# Patient Record
Sex: MALE | Race: BLACK OR AFRICAN AMERICAN | NOT HISPANIC OR LATINO | Employment: STUDENT | URBAN - METROPOLITAN AREA
[De-identification: names, ages, dates, MRNs, and addresses within clinical notes are randomized per-mention and may not be internally consistent; named-entity substitution may affect disease eponyms.]

---
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
## 2023-03-28 ENCOUNTER — HOSPITAL ENCOUNTER (EMERGENCY)
Facility: MEDICAL CENTER | Age: 18
End: 2023-03-28
Attending: EMERGENCY MEDICINE
Payer: COMMERCIAL

## 2023-03-28 VITALS
WEIGHT: 170 LBS | SYSTOLIC BLOOD PRESSURE: 145 MMHG | HEART RATE: 93 BPM | RESPIRATION RATE: 16 BRPM | DIASTOLIC BLOOD PRESSURE: 124 MMHG | BODY MASS INDEX: 23.03 KG/M2 | OXYGEN SATURATION: 97 % | TEMPERATURE: 99.9 F | HEIGHT: 72 IN

## 2023-03-28 DIAGNOSIS — B34.9 VIRAL SYNDROME: ICD-10-CM

## 2023-03-28 PROCEDURE — A9270 NON-COVERED ITEM OR SERVICE: HCPCS | Performed by: EMERGENCY MEDICINE

## 2023-03-28 PROCEDURE — 700111 HCHG RX REV CODE 636 W/ 250 OVERRIDE (IP): Performed by: EMERGENCY MEDICINE

## 2023-03-28 PROCEDURE — 700102 HCHG RX REV CODE 250 W/ 637 OVERRIDE(OP): Performed by: EMERGENCY MEDICINE

## 2023-03-28 PROCEDURE — 99283 EMERGENCY DEPT VISIT LOW MDM: CPT

## 2023-03-28 RX ORDER — ONDANSETRON 4 MG/1
4 TABLET, ORALLY DISINTEGRATING ORAL EVERY 6 HOURS PRN
Qty: 20 TABLET | Refills: 0 | Status: SHIPPED | OUTPATIENT
Start: 2023-03-28

## 2023-03-28 RX ORDER — ONDANSETRON 4 MG/1
4 TABLET, ORALLY DISINTEGRATING ORAL ONCE
Status: COMPLETED | OUTPATIENT
Start: 2023-03-28 | End: 2023-03-28

## 2023-03-28 RX ORDER — POLYETHYLENE GLYCOL 3350 17 G/17G
17 POWDER, FOR SOLUTION ORAL DAILY
Qty: 30 EACH | Refills: 0 | Status: SHIPPED | OUTPATIENT
Start: 2023-03-28

## 2023-03-28 RX ORDER — DEXAMETHASONE SODIUM PHOSPHATE 10 MG/ML
16 INJECTION, SOLUTION INTRAMUSCULAR; INTRAVENOUS ONCE
Status: COMPLETED | OUTPATIENT
Start: 2023-03-28 | End: 2023-03-28

## 2023-03-28 RX ORDER — NAPROXEN 500 MG/1
500 TABLET ORAL ONCE
Status: COMPLETED | OUTPATIENT
Start: 2023-03-28 | End: 2023-03-28

## 2023-03-28 RX ORDER — NAPROXEN 500 MG/1
500 TABLET ORAL 2 TIMES DAILY WITH MEALS
Qty: 60 TABLET | Refills: 0 | Status: SHIPPED | OUTPATIENT
Start: 2023-03-28

## 2023-03-28 RX ADMIN — NAPROXEN 500 MG: 500 TABLET ORAL at 13:54

## 2023-03-28 RX ADMIN — DEXAMETHASONE SODIUM PHOSPHATE 16 MG: 10 INJECTION INTRAMUSCULAR; INTRAVENOUS at 13:54

## 2023-03-28 RX ADMIN — ONDANSETRON 4 MG: 4 TABLET, ORALLY DISINTEGRATING ORAL at 13:54

## 2023-03-28 ASSESSMENT — PAIN DESCRIPTION - PAIN TYPE
TYPE: ACUTE PAIN
TYPE: ACUTE PAIN

## 2023-03-28 NOTE — ED PROVIDER NOTES
"ED Provider Note    Primary care provider: Curt Pagan M.D.  Means of arrival: private vehicle  History obtained from: patient  History limited by: none    CHIEF COMPLAINT  Chief Complaint   Patient presents with    Flu Like Symptoms     Patient reports to generalized not feeling well since Tuesday. Pt states he has been running a fever at home and overall not feeling well. Pt states he was seen at the student clinic on campus for which tested him for covid and flu and reported them to be negative. Patient also states he feels his wisdom teeth are coming through. No dentist.        HPI/ZOILA  Inge Dye is a 17 y.o. male who presents to the Emergency Department for evaluation of flulike symptoms.  Patient reports that for the last 6 days he has been having generalized tactile fevers, sore throat, body aches.  He went to student clinic on campus yesterday for further evaluation and was tested for flu/COVID and strep all of which were found to be negative.  He was advised on management of viral syndrome, however he elected to come here for a \"second opinion.\"  He also reports that his wisdom teeth are coming through and he was advised that he cannot have them worked on until his illness resolves.  Because of his wisdom teeth he has not been eating much and reports that he has not had a bowel movement in 6 days as well.  Denies abdominal pain, has mild nausea.      EXTERNAL RECORDS REVIEWED  None    LIMITATION TO HISTORY   None    OUTSIDE HISTORIAN(S):  None      PAST MEDICAL HISTORY   None    SURGICAL HISTORY  patient denies any surgical history    SOCIAL HISTORY  Social History     Tobacco Use    Smoking status: Never    Smokeless tobacco: Never   Vaping Use    Vaping Use: Never used   Substance Use Topics    Alcohol use: Never    Drug use: Never      Social History     Substance and Sexual Activity   Drug Use Never       FAMILY HISTORY  History reviewed. No pertinent family history.    CURRENT " MEDICATIONS  Home Medications       Reviewed by Iveth Landrum R.N. (Registered Nurse) on 03/28/23 at 1307  Med List Status: Partial     Medication Last Dose Status        Patient John Taking any Medications                           ALLERGIES  No Known Allergies    PHYSICAL EXAM  VITAL SIGNS: /75   Pulse 90   Temp 36.1 °C (96.9 °F) (Temporal)   Resp 16   Ht 1.829 m (6')   Wt 77.1 kg (170 lb)   SpO2 98%   BMI 23.06 kg/m²   Vitals reviewed by myself.  Physical Exam  Nursing note and vitals reviewed.  Constitutional: Well-developed and well-nourished. No acute distress.   HENT: Head is normocephalic and atraumatic.  Bilateral TMs are nonerythematous.  Posterior oropharynx is pink without exudates  Eyes: extra-ocular movements intact  Cardiovascular: Regular rate and  regular rhythm. No murmur heard.  Pulmonary/Chest: Breath sounds normal. No wheezes or rales.   Musculoskeletal: Extremities exhibit normal range of motion without edema or tenderness.   Neurological: Awake and alert  Skin: Skin is warm and dry. No rash.         DIAGNOSTIC STUDIES:  LABS  none    COURSE & MEDICAL DECISION MAKING    ED Observation Status? No; Patient does not meet criteria for ED Observation.     INITIAL ASSESSMENT AND PLAN    Patient is a 17-year-old male who comes in for evaluation of sore throat, body aches and fevers at home.  Differential diagnosis includes viral syndrome versus upper respiratory infection.  Lungs are clear to auscultation and he is not hypoxic therefore I have low suspicion for pneumonia.  Chest x-ray is not indicated.  He has a sore throat but I do not need to repeat testing for this as he had a negative strep swab yesterday.  He is otherwise well-appearing with normal vitals on exam.  Therefore he is reassured and advised on ongoing management of viral syndrome.  I will treat him with a dose of Decadron, naproxen and Zofran for management of his symptoms.  I will also start him on MiraLAX at home  for his constipation.  He is then given strict return precautions and discharged in stable condition.    ADDITIONAL PROBLEM LIST AND RESOURCE UTILIZATION    Additional problems aside from the chief complaint that I have addressed: none    I have discussed management of the patient with the following physicians and AYDEE's: none    Discussion of management with other QHP or appropriate source(s): none     Escalation of care considered, and ultimately not performed: diagnostic imaging.     Barriers to care at this time, including but not limited to: none.     Decision tools and prescription drugs considered including, but not limited to: see above.    Please see review of records as noted above      FINAL IMPRESSION  1. Viral syndrome

## 2023-03-28 NOTE — ED NOTES
Pt re-evaluated after medication administration. Pt notes that he has not had a bowel movement in approximately 1 week, which he did not mention earlier. Additionally, he is concerned about a fungal infection due to his fever. ERP aware.

## 2023-03-28 NOTE — ED NOTES
Pt discharged to home. Pt provided education and discharge instructions per ERP. Pt ambulatory out of ED with steady gait and personal belongings. AOx4.

## 2023-03-28 NOTE — ED TRIAGE NOTES
Inge Dye  17 y.o.    Chief Complaint   Patient presents with    Flu Like Symptoms     Patient reports to generalized not feeling well since Tuesday. Pt states he has been running a fever at home and overall not feeling well. Pt states he was seen at the student clinic on campus for which tested him for covid and flu and reported them to be negative. Patient also states he feels his wisdom teeth are coming through. No dentist.        /75   Pulse 90   Temp 36.1 °C (96.9 °F) (Temporal)   Resp 16   Ht 1.829 m (6')   Wt 77.1 kg (170 lb)   SpO2 98%   BMI 23.06 kg/m²     Pt placed in the lobby, educated to alert staff with any changes or concerns.

## 2023-03-28 NOTE — ED NOTES
Pt ambulated to the room with steady gait and without assistance. Agree with triage note. Pt also endorses generalized 8/10 abdominal pain. Denies N/V/D. No medication PTA. No further complaints at this time. Chart up for ERP.